# Patient Record
Sex: FEMALE | Race: WHITE
[De-identification: names, ages, dates, MRNs, and addresses within clinical notes are randomized per-mention and may not be internally consistent; named-entity substitution may affect disease eponyms.]

---

## 2021-12-29 ENCOUNTER — HOSPITAL ENCOUNTER (INPATIENT)
Dept: HOSPITAL 41 - JD.OBCHECK | Age: 26
LOS: 1 days | Discharge: HOME | End: 2021-12-30
Attending: OBSTETRICS & GYNECOLOGY | Admitting: OBSTETRICS & GYNECOLOGY
Payer: SELF-PAY

## 2021-12-29 DIAGNOSIS — Z20.822: ICD-10-CM

## 2021-12-29 DIAGNOSIS — Z3A.39: ICD-10-CM

## 2021-12-29 PROCEDURE — 10907ZC DRAINAGE OF AMNIOTIC FLUID, THERAPEUTIC FROM PRODUCTS OF CONCEPTION, VIA NATURAL OR ARTIFICIAL OPENING: ICD-10-PCS | Performed by: OBSTETRICS & GYNECOLOGY

## 2021-12-29 PROCEDURE — 0HQ9XZZ REPAIR PERINEUM SKIN, EXTERNAL APPROACH: ICD-10-PCS | Performed by: OBSTETRICS & GYNECOLOGY

## 2021-12-29 PROCEDURE — U0002 COVID-19 LAB TEST NON-CDC: HCPCS

## 2021-12-29 RX ADMIN — Medication SCH: at 21:28

## 2021-12-29 RX ADMIN — Medication SCH: at 18:50

## 2021-12-29 NOTE — PCM.SN.2
- Free Text/Narrative


Note: 





Delivery note:





Stage I: Amada is a 26-year-old  3 para 1-0-1-1 female who is admitted 

to labor and delivery on 2021 in active labor with a gestational age of 

39-2/7 weeks and an LOLIS of 1/3/2022.  She was approximately 4-1/2-5 cm upon 

admission, 90% effaced, cephalic presentation, anterior, bulging bag shepherd, 

very stretchy.  She underwent artificial rupture membranes with resultant clear 

amniotic fluid.  She had a natural labor and declined any analgesia.  Fetal 

heart tones were reassuring throughout labor course.  Labor progressed rapidly 

and at approximately 0900 hrs. she became completely dilated.  At approximately 

930 hours she began pushing and with 2 contractions delivered the baby.





Stage II: Baby delivered in a direct occiput anterior position.  After external 

restitution of the head the anterior shoulder and posterior shoulder delivered 

with gentle upward.  Baby was then placed on mom's abdomen.  Baby delivered at 

38 hours on 2021.  Amada had a small superficial laceration which was 

eventually closed with 1 suture of 3-0 Monocryl.  No analgesia/anesthesia was 

used for the repair.  Patient tolerated this well..  Baby is female infant, 

Apgars 8 and 8.  Weight was 3510 g -(7 pounds 12 ounces) and length is 20.0 

inches.  The baby was placed on mom's abdomen.  Nose and mouth were bulb 

suctioned.  The cord is allowed to pulsate for 3 minutes and then was clamped x2

and cut by the baby's father JR.  The Pitocin was increased to 500 cc an hour to

facilitate increase in uterine tone and decrease likelihood of bleeding.  Cord 

blood was obtained.  The umbilical cord had 3 vessels.





Stage III: The placenta delivered at 0943 hrs. on 2021 in a [place] 

presentation.  It appeared intact and complete and was discarded per patient 

desire.  Estimate blood loss was 200 cc.  Patient plans to breast-feed.  

Condition: Good

## 2021-12-29 NOTE — PCM.LDHP
L&D History of Present Illness





- General


Date of Service: 21


Admit Problem/Dx: 


                   Patient Status Order with Admit Dx/Problem





21 06:06


Patient Status [ADT] Routine 





21 06:48


Patient Status [ADT] Routine 








                           Admission Diagnosis/Problem











Admission Diagnosis/Problem    Pregnancy














21 09:26


Amada is a 26-year-old  3 para 1-0-1-1 female who is admitted to labor 

and delivery on 2021 in active labor with a gestational age of 39-2/7 

weeks and an LOLIS of 1/3/2022.


Source of Information: Patient


History Limitations: Reports: No Limitations





- History of Present Illness


Introduction:: 





Amada is a 26-year-old  3 para 1-0-1-1 female who is admitted to labor 

and delivery on 2021 in active labor with a gestational age of 39-2/7 

weeks and an LOLIS of 1/3/2022.  She reports contractions started this AM.  They 

have progressed and upon admission were every 3 to 5 minutes apart.  Moderate in

 intensity.  Cervix had changed from 2 cm on last evaluation clinic on 

2021 to 5 cm upon admission.  Bulging bag shepherd noted.  No bleeding was 

noted.  Upon admission fetal heart tones were good and contractions were 

confirmed with the monitor and clinical evaluation.





OB/GYN history: Patient is a  3 para 1-0-1-1.  Her last delivery was a 

vaginal delivery at term in .  Specifically 2017 at 39-2/7 weeks.  Male

 infant named Torey.





Prenatal history: Patient is seen at 10 weeks 3 days.  She is seen on a regular 

basis.  Weight gain was from approximately 130 to 170 pounds.  Vital signs are 

stable throughout the prenatal course and fundal height growth was appropriate.





Prenatal labs included blood type of A+ with a negative antibody screen.  

Hemoglobin at first aleksandra visit was 13.9 g/dL and platelets were 269,000.  

Rubella titer showed immunity.  RPR was nonreactive.  Hepatitis B surface 

antigen and hepatitis C assays were both nonreactive.  Chlamydia and gonorrhea 

were negative.  Second trimester hemoglobin is 13.3.  1 hour GTT was 114.  

Platelets were 219,000.  Group B strep screen was negative.





Allergies: None





Medications:





1.  Prenatal vitamins 1 daily





2.  Lexapro 10 mg p.o. daily





3.  Zofran 4 mg every 4 hours as needed for nausea.





Past medical history:





1.   2017 as above.





2.  Miscarriage first trimester





3.  Depression and anxiety historypostpartum after last pregnancy





4.  History of chlamydia





Past surgical history: Unremarkable





Family history: Maternal uncle with heart disease and a stroke.  Mother with 

heart murmur otherwise unremarkable specifically unremarkable bleeding 

disorders, blood clot disorders, anesthesia or unusual reactions to medications.





Social history: Patient is .   is JR.  [place], [place].  She does not use any 

significance alcohol, drugs or tobacco.





Review of systems:





Review of systems: In general patient has no complaints other than contractions 

as outlined above.  Baby has been active.





Skin: Negative





Lungs: No infectious symptoms or shortness of breath





Cardiovascular: No chest pain or exercise intolerance





Breasts: Changes associated with pregnancy.  Patient plans to breast-feed.





GI: Negative





: Pregnancy changes.





Musculoskeletal: Negative





Neurological: Negative





Physical exam:





In general the patient is well-developed, well-nourished, pleasant female of 

stated age in no acute distress.





Skin is warm dry without lesions.





HEENT, neck and back within normal limits.





Lungs are clear with good breath sounds in all lung fields.





Cardiovascular exam shows regular and rhythm without murmurs.





Breast exam is deferred reported to have been done at first prenatal visit.  It 

is not repeated at this time.





Abdomen is gravid with fundal height consistent with term pregnancy.





Genital per my initial evaluation in labor and delivery shows cervix to be 5 cm,

 100% effaced, bulging bag shepherd, anterior position, very soft, cephalic 

presentation noted..





Extremities and neurological exam are grossly within normal limits.





- Related Data


Allergies/Adverse Reactions: 


                                    Allergies











Allergy/AdvReac Type Severity Reaction Status Date / Time


 


No Known Allergies Allergy   Verified 21 06:06














Past Medical History


OB/GYN History: Reports: Pregnancy, Spontaneous 


Psychiatric History: Reports: Other (See Below)


Other Psychiatric History: PP Depression





- Past Surgical History


HEENT Surgical History: Reports: Other (See Below)


Other HEENT Surgeries/Procedures: [place] teeth extraction





Social & Family History





- Family History


Family Medical History: No Pertinent Family History





- Tobacco Use


Tobacco Use Status *Q: Never Tobacco User


Second Hand Smoke Exposure: No





- Recreational Drug Use


Recreational Drug Use: No





H&P Review of Systems





- Review of Systems:


Review Of Systems: See Below





L&D Exam





- Exam


Exam: See Below





- Vital Signs


Vital Signs: 


                                Last Vital Signs











Temp  37.1 C   21 06:06


 


Pulse  81   21 06:06


 


Resp  16   21 06:06


 


BP  128/81   21 06:06


 


Pulse Ox  100   21 06:06











Weight: 78.925 kg





- Patient Data


Lab Results Last 24 hrs: 


                         Laboratory Results - last 24 hr











  21 Range/Units





  06:58 07:20 07:20 


 


WBC   13.74 H   (3.98-10.04)  K/mm3


 


RBC   4.04   (3.98-5.22)  M/mm3


 


Hgb   13.3   (11.2-15.7)  gm/dl


 


Hct   39.2   (34.1-44.9)  %


 


MCV   97.0 H   (79.4-94.8)  fl


 


MCH   32.9 H   (25.6-32.2)  pg


 


MCHC   33.9   (32.2-35.5)  g/dl


 


RDW Std Deviation   46.4 H   (36.4-46.3)  fL


 


Plt Count   229   (182-369)  K/mm3


 


MPV   10.9   (9.4-12.3)  fl


 


Neut % (Auto)   80.3 H   (34.0-71.1)  %


 


Lymph % (Auto)   8.2 L   (19.3-51.7)  %


 


Mono % (Auto)   10.7   (4.7-12.5)  %


 


Eos % (Auto)   0.5 L   (0.7-5.8)  


 


Baso % (Auto)   0.1   (0.1-1.2)  %


 


Neut # (Auto)   11.02 H   (1.56-6.13)  K/mm3


 


Lymph # (Auto)   1.13 L   (1.18-3.74)  K/mm3


 


Mono # (Auto)   1.47 H   (0.24-0.36)  K/mm3


 


Eos # (Auto)   0.07   (0.04-0.36)  K/mm3


 


Baso # (Auto)   0.02   (0.01-0.08)  K/mm3


 


SARS-CoV-2 RNA (MARTITA)  Negative    (NEGATIVE)  


 


Blood Type    A POSITIVE  


 


Gel Antibody Screen    Negative  











Result Diagrams: 


                                 21 07:20








- Problem List


(1) 39 weeks gestation of pregnancy


SNOMED Code(s): 85082741


   ICD Code: Z3A.39 - 39 WEEKS GESTATION OF PREGNANCY   Status: Acute   Current 

Visit: Yes   


Problem List Initiated/Reviewed/Updated: Yes


Orders Last 24hrs: 


                               Active Orders 24 hr











 Category Date Time Status


 


 Patient Status [ADT] Routine ADT  21 06:06 Active


 


 Patient Status [ADT] Routine ADT  21 06:48 Active


 


 Activity as Tolerated [RC] PFP Care  21 06:48 Active


 


 Communication Order [RC] ASDIRECTED Care  21 06:48 Active


 


 Fetal Heart Tones [RC] ASDIRECTED Care  21 06:48 Active


 


 Fetal Non Stress Test [RC] PER UNIT ROUTINE Care  21 06:48 Active


 


 Notify Provider [RC] PFP Care  21 06:48 Active


 


 Notify Provider [RC] PRN Care  21 06:48 Active


 


 Peripheral IV Care [RC] .AS DIRECTED Care  21 06:48 Active


 


 Vital Signs [RC] PER UNIT ROUTINE Care  21 06:06 Active


 


 Vital Signs [RC] PER UNIT ROUTINE Care  21 06:48 Active


 


 Regular Diet [DIET] Diet  21 Breakfast Active


 


 PATIENT RETYPE [BBK] Routine Lab  21 08:46 Ordered


 


 RAPID PLASMA REAGIN,RPR [CHEM] Routine Lab  21 07:20 Received


 


 Lactated Ringers [Ringers, Lactated] 1,000 ml Med  21 07:00 Active





 IV ASDIRECTED   


 


 Nalbuphine [Nubain] Med  21 06:47 Active





 10 mg IVPUSH Q2H PRN   


 


 Ondansetron [Zofran] Med  21 06:47 Active





 4 mg IVPUSH Q4H PRN   


 


 Oxytocin/Lactated Ringers [Pitocin in LR 10 Units/1,000 Med  21 07:00 

Active





 ML]   





 10 unit in 1,000 ml IV .CONTINUOUS   


 


 Sodium Chloride 0.9% [Saline Flush] Med  21 09:00 Active





 10 ml FLUSH 0900,2100   


 


 Electronic Fetal Heart Tones Ext w TOCO [WOMSER] Oth  21 06:48 Ordered





 Routine   


 


 Electronic Fetal Heart Tones Internal [WOMSER] Per Unit Ot  21 06:48 

Ordered





 Routine   


 


 Peripheral IV Insertion Adult [OM.PC] Routine Oth  21 06:48 Ordered


 


 Resuscitation Status Routine Resus Stat  21 06:06 Ordered








                                Medication Orders





Lactated Ringer's (Ringers, Lactated)  1,000 mls @ 100 mls/hr IV ASDIRECTED KADIE


Oxytocin/Lactated Ringer's (Pitocin In Lr 10 Units/1,000 Ml)  10 unit in 1,000 

mls @ 500 mls/hr IV .CONTINUOUS KADIE


Nalbuphine HCl (Nalbuphine 10 Mg/1 Ml Vial)  10 mg IVPUSH Q2H PRN


   PRN Reason: Pain


Ondansetron HCl (Ondansetron 4 Mg/2 Ml Sdv)  4 mg IVPUSH Q4H PRN


   PRN Reason: Nausea/Vomiting


Sodium Chloride (Sodium Chloride 0.9% 10 Ml Syringe)  10 ml FLUSH 0900,2100 [facility]








Assessment/Plan Comment:: 





1Jayme is a 26-year-old  3 para 1-0-1-1 female who is admitted to labor

 and delivery on 2021 in active labor with a gestational age of 39-2/7 

weeks and an LOLIS of 1/3/2022.





2.  Patient plans to breast-feed





3.  Risk factors for the pregnancy: Minimal





4.  Group B strep screen negative





5.  Patient plans for natural labor.





6.  Tdap given on 2021.  She is rubella immune.





Plan:





1.  Anticipate 





2.  Routine labs per protocol consist of COVID-19 and RPR.





3.  Support breast-feeding decision

## 2021-12-29 NOTE — PCM.PREANE
Preanesthetic Assessment





- Procedure


Proposed Procedure: 





Epidural





- Anesthesia/Transfusion/Family Hx


Anesthesia History: Prior Anesthesia Without Reaction


Family History of Anesthesia Reaction: No


Transfusion History: No Prior Transfusion(s)


Intubation History: Unknown





- Review of Systems


General: No Symptoms


Pulmonary: No Symptoms


Cardiovascular: No Symptoms, Lightheadedness (positonal changes: get up too 

quickly)


Gastrointestinal: No Symptoms, Constipation, Nausea


Neurological: No Symptoms (Motion sickness)


Other: Reports: None, Sinus Problem (congestion)





- Physical Assessment


NPO Status Date: 12/29/21


NPO Status Time: 05:30


Vital Signs: 





                                Last Vital Signs











Temp  37.1 C   12/29/21 06:06


 


Pulse  81   12/29/21 06:06


 


Resp  16   12/29/21 06:06


 


BP  128/81   12/29/21 06:06


 


Pulse Ox  100   12/29/21 06:06











Height: 1.8 m


Weight: 78.925 kg


ASA Class: 2


Mental Status: Alert & Oriented x3


Airway Class: Mallampati = 2


Dentition: Reports: Normal Dentition, Caries


Thyro-Mental Finger Breadths: 3


Mouth Opening Finger Breadths: 3


ROM/Head Extension: Full


Lungs: Clear to Auscultation, Normal Respiratory Effort


Cardiovascular: Regular Rate, Regular Rhythm, No Murmurs





- Lab


Values: 





                             Laboratory Last Values











WBC  13.74 K/mm3 (3.98-10.04)  H  12/29/21  07:20    


 


RBC  4.04 M/mm3 (3.98-5.22)   12/29/21  07:20    


 


Hgb  13.3 gm/dl (11.2-15.7)   12/29/21  07:20    


 


Hct  39.2 % (34.1-44.9)   12/29/21  07:20    


 


MCV  97.0 fl (79.4-94.8)  H  12/29/21  07:20    


 


MCH  32.9 pg (25.6-32.2)  H  12/29/21  07:20    


 


MCHC  33.9 g/dl (32.2-35.5)   12/29/21  07:20    


 


RDW Std Deviation  46.4 fL (36.4-46.3)  H  12/29/21  07:20    


 


Plt Count  229 K/mm3 (182-369)   12/29/21  07:20    


 


MPV  10.9 fl (9.4-12.3)   12/29/21  07:20    


 


Neut % (Auto)  80.3 % (34.0-71.1)  H  12/29/21  07:20    


 


Lymph % (Auto)  8.2 % (19.3-51.7)  L  12/29/21  07:20    


 


Mono % (Auto)  10.7 % (4.7-12.5)   12/29/21  07:20    


 


Eos % (Auto)  0.5  (0.7-5.8)  L  12/29/21  07:20    


 


Baso % (Auto)  0.1 % (0.1-1.2)   12/29/21  07:20    


 


Neut # (Auto)  11.02 K/mm3 (1.56-6.13)  H  12/29/21  07:20    


 


Lymph # (Auto)  1.13 K/mm3 (1.18-3.74)  L  12/29/21  07:20    


 


Mono # (Auto)  1.47 K/mm3 (0.24-0.36)  H  12/29/21  07:20    


 


Eos # (Auto)  0.07 K/mm3 (0.04-0.36)   12/29/21  07:20    


 


Baso # (Auto)  0.02 K/mm3 (0.01-0.08)   12/29/21  07:20    








Above labs reviewed and noted and within acceptable ranges to proceed with 

epidural if desired.





- Allergies


Allergies/Adverse Reactions: 


                                    Allergies











Allergy/AdvReac Type Severity Reaction Status Date / Time


 


No Known Allergies Allergy   Verified 12/29/21 06:06














- Anesthesia Plan


Pre-Op Medication Ordered: None





- Acknowledgements


Anesthesia Type Planned: Spinal, Epidural


Pt an Appropriate Candidate for the Planned Anesthesia: Yes


Alternatives and Risks of Anesthesia Discussed w Pt/Guardian: Yes


Pt/Guardian Understands and Agrees with Anesthesia Plan: Yes





PreAnesthesia Questionnaire





- CURRENT (IN HOUSE) MEDS


Current Meds: 





                               Current Medications





Lactated Ringer's (Ringers, Lactated)  1,000 mls @ 100 mls/hr IV ASDIRECTED KADIE


Oxytocin/Lactated Ringer's (Pitocin In Lr 10 Units/1,000 Ml)  10 unit in 1,000 

mls @ 500 mls/hr IV .CONTINUOUS KADIE


Nalbuphine HCl (Nalbuphine 10 Mg/1 Ml Vial)  10 mg IVPUSH Q2H PRN


   PRN Reason: Pain


Ondansetron HCl (Ondansetron 4 Mg/2 Ml Sdv)  4 mg IVPUSH Q4H PRN


   PRN Reason: Nausea/Vomiting


Sodium Chloride (Sodium Chloride 0.9% 10 Ml Syringe)  10 ml FLUSH 0900,2100 KADIE





Discontinued Medications





Lidocaine HCl (Lidocaine 1% 50 Ml Mdv)  50 ml INJECT ONETIME ONE


   Stop: 12/29/21 06:48

## 2021-12-30 RX ADMIN — Medication SCH: at 11:52

## 2021-12-30 NOTE — PCM.DCSUM1
**Discharge Summary





- Hospital Course


Free Text/Narrative:: 





Delivery note:





Stage I: Amada is a 26-year-old  3 para 1-0-1-1 female who is admitted 

to labor and delivery on 2021 in active labor with a gestational age of 

39-2/7 weeks and an LOLIS of 1/3/2022.  She was approximately 4-1/2-5 cm upon 

admission, 90% effaced, cephalic presentation, anterior, bulging bag shepherd, 

very stretchy.  She underwent artificial rupture membranes with resultant clear 

amniotic fluid.  She had a natural labor and declined any analgesia.  Fetal 

heart tones were reassuring throughout labor course.  Labor progressed rapidly 

and at approximately 0900 hrs. she became completely dilated.  At approximately 

930 hours she began pushing and with 2 contractions delivered the baby.





Stage II: Baby delivered in a direct occiput anterior position.  After external 

restitution of the head the anterior shoulder and posterior shoulder delivered 

with gentle upward.  Baby was then placed on mom's abdomen.  Baby delivered at 

38 hours on 2021.  Amada had a small superficial laceration which was 

eventually closed with 1 suture of 3-0 Monocryl.  No analgesia/anesthesia was 

used for the repair.  Patient tolerated this well..  Baby is female infant, 

Apgars 8 and 8.  Weight was 3510 g -(7 pounds 12 ounces) and length is 20.0 

inches.  The baby was placed on mom's abdomen.  Nose and mouth were bulb 

suctioned.  The cord is allowed to pulsate for 3 minutes and then was clamped x2

and cut by the baby's father JR.  The Pitocin was increased to 500 cc an hour to

facilitate increase in uterine tone and decrease likelihood of bleeding.  Cord 

blood was obtained.  The umbilical cord had 3 vessels.





Stage III: The placenta delivered at 0943 hrs. on 2021 in a [place] 

presentation.  It appeared intact and complete and was discarded per patient 

desire.  Estimate blood loss was 200 cc.  Patient plans to breast-feed.  Condit

ion: Good





Diagnosis: Stroke: No





- Discharge Data


Discharge Date: 21


Discharge Disposition: Home, Self-Care 01


Condition: Good





- Referral to Home Health


Primary Care Physician: 


Mary Jimenes MD








- Discharge Diagnosis/Problem(s)


(1) Vaginal delivery


SNOMED Code(s): 545611119


   ICD Code: O80 - ENCOUNTER FOR FULL-TERM UNCOMPLICATED DELIVERY   Status: 

Acute   Current Visit: Yes   





(2) History of postpartum depression


SNOMED Code(s): 518604287


   ICD Code: Z87.59 - PERSONAL HISTORY OF COMP OF PREG, CHLDBRTH AND THE PUERP; 

Z86.59 - PERSONAL HISTORY OF OTHER MENTAL AND BEHAVIORAL DISORDERS   Status: 

Acute   Current Visit: Yes   





(3) 39 weeks gestation of pregnancy


SNOMED Code(s): 92122330


   ICD Code: Z3A.39 - 39 WEEKS GESTATION OF PREGNANCY   Status: Acute   Current 

Visit: Yes   





- Patient Summary/Data


Complications: None


Consults: 


None


Hospital Course: 





Amada Cardenas was admitted for active labor.  On admission her cervix was 

dilated to 4.5 to 5 cm.  She was GBS negative.  She had artificial rupture of 

membranes with clear fluid.  She progressed to complete and began pushing.  On 

2021 she had a normal vaginal delivery of a live male infant at 09:38.  

Apgars of 8 and 8.  Weight of 3510 g (7 pounds 11.8 ounces).  Her postpartum 

course was uneventful.  Her pain was well controlled and she had minimal lochia.

 She was ambulating, tolerating a regular diet and voiding normally.  She was 

breast-feeding with minimal difficulty.  She was afebrile and her hematocrit was

39.2 on admission.  She desired to be discharged home on the morning of PPD #1. 

Her blood type is A+.








- Patient Instructions


Diet: Regular Diet as Tolerated


Activity: Apply Ice, As Tolerated


Activity, Other: Nothing in the vagina for 6 weeks


Driving: May Drive Today


Showering/Bathing: May Shower


Notify Provider of: Fever, Increased Pain, Swelling and Redness, Drainage, 

Nausea and/or Vomiting


Other/Special Instructions: Please contact your physician's office if you have 

heavy vaginal bleeding enough to soak a pad in less than an hour for several 

hours.  Monitor for any signs of an infection in the breasts with severe pain or

redness of the breast.





- Discharge Plan


*PRESCRIPTION DRUG MONITORING PROGRAM REVIEWED*: Not Applicable


*COPY OF PRESCRIPTION DRUG MONITORING REPORT IN PATIENT KAMLA: Not Applicable


Home Medications: 


                                    Home Meds





Acetaminophen [Tylenol] 650 mg PO Q6H PRN  tablet 21 [Rx]


Benzocaine/Menthol [Dermoplast Pain Relief 20%-0.5% Spray] 1 spray TOP 

ASDIRECTED PRN  canister 21 [Rx]


Ibuprofen [Motrin] 600 mg PO Q6H PRN  tablet 21 [Rx]


Prenatal Vit with Ca/FA/Iron [Prenatal Plus Iron] 1 each PO DAILY  tablet 

21 [Rx]


witch hazeL [Tucks] 1 pad TOP ASDIRECTED PRN  pad 21 [Rx]








Patient Handouts:  Care of a Perineal Tear, Postpartum Care After Vaginal 

Delivery


Referrals: 


Mary Jimenes MD [Primary Care Provider] -  (Follow-up in 3 to 6 

weeks for routine postpartum visit or earlier as needed.)





- Discharge Summary/Plan Comment


DC Time >30 min.: No


Total # of Minutes for Discharge Time: 





15 minutes





- Patient Data


Vitals - Most Recent: 


                                Last Vital Signs











Temp  36.8 C   21 08:20


 


Pulse  63   21 08:20


 


Resp  12   21 08:20


 


BP  126/69   21 08:20


 


Pulse Ox  97   21 08:20











Weight - Most Recent: 78.925 kg


I&O - Last 24 hours: 


                                 Intake & Output











 21





 22:59 06:59 14:59


 


Intake Total 0  


 


Balance 0  











Lab Results - Last 24 hrs: 


                         Laboratory Results - last 24 hr











  21 Range/Units





  07:20 


 


RPR  Non-reactive  (NONREACTIVE)  











Med Orders - Current: 


                               Current Medications





Acetaminophen (Acetaminophen 325 Mg Tab)  650 mg PO Q4H PRN


   PRN Reason: mild pain or fever


Benzocaine/Menthol (Benzocaine/Menthol 20%-0.5% Spray 78 Gm Cannister)  0 gm TOP

ASDIRECTED PRN


   PRN Reason: Perineal Comfort Measure


   Last Admin: 21 11:05 Dose:  1 can


   Documented by: 


Lactated Ringer's (Ringers, Lactated)  1,000 mls @ 100 mls/hr IV ASDIRECTED KADIE


Oxytocin/Lactated Ringer's (Pitocin In Lr 10 Units/1,000 Ml)  10 unit in 1,000 

mls @ 500 mls/hr IV .CONTINUOUS KADIE


   Last Admin: 21 11:06 Dose:  500 mls/hr


   Documented by: 


Ibuprofen (Ibuprofen 600 Mg Tab)  600 mg PO Q4H PRN


   PRN Reason: Mild pain or fever


   Last Admin: 21 06:42 Dose:  600 mg


   Documented by: 


Nalbuphine HCl (Nalbuphine 10 Mg/1 Ml Vial)  10 mg IVPUSH Q2H PRN


   PRN Reason: Pain


Ondansetron HCl (Ondansetron 4 Mg/2 Ml Sdv)  4 mg IVPUSH Q4H PRN


   PRN Reason: Nausea/Vomiting


Prenat Multivit/Narberth/Iron/Folic Ac (Prenatal Multivitamin With Calcium/Folic 

Acid/Iron Tab)  1 each PO DAILY [facility]


   Last Admin: 21 08:23 Dose:  1 each


   Documented by: 


Sodium Chloride (Sodium Chloride 0.9% 10 Ml Syringe)  10 ml FLUSH 0900,2100 [facility]


   Last Admin: 21 11:52 Dose:  Not Given


   Documented by: 


Witch Hazel (Witch Hazel Medicated Pads 40/Jar)  1 pad TOP ASDIRECTED PRN


   PRN Reason: Perineal Comfort Measure


   Last Admin: 21 11:05 Dose:  1 tub


   Documented by: 





Discontinued Medications





Lidocaine HCl (Lidocaine 1% 50 Ml Mdv)  50 ml INJECT ONETIME ONE


   Stop: 21 06:48


   Last Admin: 21 18:50 Dose:  Not Given


   Documented by:

## 2021-12-30 NOTE — PCM.SN.2
- Free Text/Narrative


Note: 





Post Partum Progress Note


PPD #1





Subjective:


Doing well overall.  Ambulating without difficulty.  Lochia minimal.  Voiding 

without difficulty.  Tolerating regular diet without nausea or vomiting.  Pain 

controlled with oral medications.  Breast-feeding with minimal difficulty.





Objective: 


Vitals:


                               Vital Signs - 24 hr











  21





  15:00 16:08 19:51


 


Temperature  36.3 C 36.4 C


 


Temperature [ 36.3 C  





Temporal]   


 


Pulse,  72 78





Peripheral   


 


Pulse, 72  





Peripheral [   





Pulse Oximetry]   


 


Respiratory 14 14 14





Rate   


 


Blood Pressure  121/79 119/73


 


Blood Pressure 121/79  





[Left Arm]   


 


O2 Sat by Pulse 96 96 97





Oximetry   














  21





  04:11


 


Temperature 36.4 C


 


Temperature [ 





Temporal] 


 


Pulse, 71





Peripheral 


 


Pulse, 





Peripheral [ 





Pulse Oximetry] 


 


Respiratory 14





Rate 


 


Blood Pressure 110/56 L


 


Blood Pressure 





[Left Arm] 


 


O2 Sat by Pulse 94 L





Oximetry 














Physical Exam


General: Alert and oriented, no acute distress


Lungs: Clear to auscultation bilaterally


Heart: Regular rate and rhythm


Abdomen: Soft, minimal appropriate tenderness, non-distended, fundus midline, 

nontender, and 3 fingerbreadths below the umbilicus


Extremities: No edema in bilateral lower extremities, no calf tenderness 

bilaterally








                         Laboratory Results - last 24 hr











  21 Range/Units





  06:58 07:20 07:20 


 


RPR    Non-reactive  (NONREACTIVE)  


 


SARS-CoV-2 RNA ([facility])  Negative    (NEGATIVE)  


 


Blood Type   A POSITIVE   


 


Gel Antibody Screen   Negative   














ASSESSMENT: 


26-year-old female -0-1-2 s/p normal vaginal delivery PPD #1, complicated 

by history of postpartum depression





PLAN: 


Doing well


Breast-feeding with minimal difficulty. Assist as needed


Lochia minimal. Continue to monitor for appropriate lochia.


Continue routine postpartum care


Anticipate discharge home today








Zaheer Granda MD


7:42 AM


2021